# Patient Record
Sex: FEMALE | URBAN - NONMETROPOLITAN AREA
[De-identification: names, ages, dates, MRNs, and addresses within clinical notes are randomized per-mention and may not be internally consistent; named-entity substitution may affect disease eponyms.]

---

## 2018-02-14 ENCOUNTER — CONVERSION ENCOUNTER (OUTPATIENT)
Dept: FAMILY MEDICINE CLINIC | Age: 33
End: 2018-02-14

## 2018-02-15 LAB
ALBUMIN SERPL-MCNC: 4.4 G/DL
ALBUMIN/GLOB SERPL: 1.7 {RATIO}
ALP SERPL-CCNC: 88 IU/L
ALT SERPL-CCNC: 10 IU/L
AST SERPL-CCNC: 15 IU/L
BASOPHILS # BLD AUTO: 0 X10E3/UL
BASOPHILS NFR BLD AUTO: 0 %
BILIRUB SERPL-MCNC: 0.5 MG/DL
BUN SERPL-MCNC: 13 MG/DL
BUN/CREAT SERPL: 19
CALCIUM SERPL-MCNC: 9.3 MG/DL
CHLORIDE SERPL-SCNC: 101 MMOL/L
CHOLEST SERPL-MCNC: 186 MG/DL
CO2 SERPL-SCNC: 23 MMOL/L
CONV IMMATURE GRAN: 0 %
CONV TOTAL PROTEIN: 7 G/DL
CREAT UR-MCNC: 0.69 MG/DL
EOSINOPHIL # BLD AUTO: 0.1 X10E3/UL
EOSINOPHIL # BLD AUTO: 2 %
ERYTHROCYTE [DISTWIDTH] IN BLOOD BY AUTOMATED COUNT: 13.3 %
GLOBULIN UR ELPH-MCNC: 2.6 G/DL
GLUCOSE SERPL-MCNC: 75 MG/DL
HCT VFR BLD AUTO: 39.9 %
HDLC SERPL-MCNC: 44 MG/DL
HGB BLD-MCNC: 13.2 G/DL
IMM GRANULOCYTES # BLD: 0 X10E3/UL
LDLC SERPL CALC-MCNC: 130 MG/DL
LYMPHOCYTES # BLD AUTO: 1.6 X10E3/UL
LYMPHOCYTES NFR BLD AUTO: 29 %
MCH RBC QN AUTO: 32.4 PG
MCHC RBC AUTO-ENTMCNC: 33.1 G/DL
MCV RBC AUTO: 98 FL
MONOCYTES # BLD AUTO: 0.4 X10E3/UL
MONOCYTES NFR BLD AUTO: 7 %
NEUTROPHILS # BLD AUTO: 3.5 X10E3/UL
NEUTROPHILS NFR BLD AUTO: 62 %
PLATELET # BLD AUTO: 280 X10E3/UL
POTASSIUM SERPL-SCNC: 4.4 MMOL/L
RBC # BLD AUTO: 4.07 X10E6/UL
SODIUM SERPL-SCNC: 137 MMOL/L
TRIGL SERPL-MCNC: 60 MG/DL
TSH SERPL-ACNC: 1.52 UIU/ML
VLDLC SERPL-MCNC: 12 MG/DL
WBC # BLD AUTO: 5.7 X10E3/UL

## 2018-02-19 ENCOUNTER — OFFICE VISIT CONVERTED (OUTPATIENT)
Dept: FAMILY MEDICINE CLINIC | Age: 33
End: 2018-02-19
Attending: NURSE PRACTITIONER

## 2019-01-11 ENCOUNTER — OFFICE VISIT CONVERTED (OUTPATIENT)
Dept: FAMILY MEDICINE CLINIC | Age: 34
End: 2019-01-11
Attending: NURSE PRACTITIONER

## 2020-02-21 ENCOUNTER — OFFICE VISIT CONVERTED (OUTPATIENT)
Dept: FAMILY MEDICINE CLINIC | Age: 35
End: 2020-02-21
Attending: FAMILY MEDICINE

## 2020-02-21 ENCOUNTER — HOSPITAL ENCOUNTER (OUTPATIENT)
Dept: OTHER | Facility: HOSPITAL | Age: 35
Discharge: HOME | End: 2020-02-21
Attending: FAMILY MEDICINE

## 2020-02-21 LAB
ALBUMIN SERPL-MCNC: 4.5 G/DL (ref 3.5–5)
ALBUMIN/GLOB SERPL: 1.5 {RATIO} (ref 1.4–2.6)
ALP SERPL-CCNC: 99 U/L (ref 42–98)
ALT SERPL-CCNC: 12 U/L (ref 10–40)
ANION GAP SERPL CALC-SCNC: 19 MMOL/L (ref 8–19)
AST SERPL-CCNC: 17 U/L (ref 15–50)
BASOPHILS # BLD MANUAL: 0.03 10*3/UL (ref 0–0.2)
BASOPHILS NFR BLD MANUAL: 0.5 % (ref 0–3)
BILIRUB SERPL-MCNC: 0.35 MG/DL (ref 0.2–1.3)
BUN SERPL-MCNC: 10 MG/DL (ref 5–25)
BUN/CREAT SERPL: 13 {RATIO} (ref 6–20)
CALCIUM SERPL-MCNC: 9.5 MG/DL (ref 8.7–10.4)
CHLORIDE SERPL-SCNC: 105 MMOL/L (ref 99–111)
CHOLEST SERPL-MCNC: 185 MG/DL (ref 107–200)
CHOLEST/HDLC SERPL: 3.9 {RATIO} (ref 3–6)
CONV CO2: 22 MMOL/L (ref 22–32)
CONV TOTAL PROTEIN: 7.6 G/DL (ref 6.3–8.2)
CREAT UR-MCNC: 0.77 MG/DL (ref 0.5–0.9)
DEPRECATED RDW RBC AUTO: 43.4 FL
EOSINOPHIL # BLD MANUAL: 0.17 10*3/UL (ref 0–0.7)
EOSINOPHIL NFR BLD MANUAL: 3.1 % (ref 0–7)
ERYTHROCYTE [DISTWIDTH] IN BLOOD BY AUTOMATED COUNT: 12 % (ref 11.5–14.5)
GFR SERPLBLD BASED ON 1.73 SQ M-ARVRAT: >60 ML/MIN/{1.73_M2}
GLOBULIN UR ELPH-MCNC: 3.1 G/DL (ref 2–3.5)
GLUCOSE SERPL-MCNC: 86 MG/DL (ref 65–99)
GRANS (ABSOLUTE): 3.03 10*3/UL (ref 2–8)
GRANS: 55.3 % (ref 30–85)
HBA1C MFR BLD: 13.9 G/DL (ref 12–16)
HCT VFR BLD AUTO: 41.6 % (ref 37–47)
HDLC SERPL-MCNC: 47 MG/DL (ref 40–60)
IMM GRANULOCYTES # BLD: 0 10*3/UL (ref 0–0.54)
IMM GRANULOCYTES NFR BLD: 0 % (ref 0–0.43)
LDLC SERPL CALC-MCNC: 123 MG/DL (ref 70–100)
LYMPHOCYTES # BLD MANUAL: 1.89 10*3/UL (ref 1–5)
LYMPHOCYTES NFR BLD MANUAL: 6.6 % (ref 3–10)
MCH RBC QN AUTO: 32.6 PG (ref 27–31)
MCHC RBC AUTO-ENTMCNC: 33.4 G/DL (ref 33–37)
MCV RBC AUTO: 97.4 FL (ref 81–99)
MONOCYTES # BLD AUTO: 0.36 10*3/UL (ref 0.2–1.2)
OSMOLALITY SERPL CALC.SUM OF ELEC: 292 MOSM/KG (ref 273–304)
PLATELET # BLD AUTO: 308 10*3/UL (ref 130–400)
PMV BLD AUTO: 9.2 FL (ref 7.4–10.4)
POTASSIUM SERPL-SCNC: 4.3 MMOL/L (ref 3.5–5.3)
RBC # BLD AUTO: 4.27 10*6/UL (ref 4.2–5.4)
SODIUM SERPL-SCNC: 142 MMOL/L (ref 135–147)
TRIGL SERPL-MCNC: 76 MG/DL (ref 40–150)
TSH SERPL-ACNC: 1.47 M[IU]/L (ref 0.27–4.2)
VARIANT LYMPHS NFR BLD MANUAL: 34.5 % (ref 20–45)
VLDLC SERPL-MCNC: 15 MG/DL (ref 5–37)
WBC # BLD AUTO: 5.48 10*3/UL (ref 4.8–10.8)

## 2020-11-03 ENCOUNTER — HOSPITAL ENCOUNTER (OUTPATIENT)
Dept: OTHER | Facility: HOSPITAL | Age: 35
Discharge: HOME | End: 2020-11-03
Attending: NURSE PRACTITIONER

## 2020-11-03 ENCOUNTER — OFFICE VISIT CONVERTED (OUTPATIENT)
Dept: FAMILY MEDICINE CLINIC | Age: 35
End: 2020-11-03

## 2020-11-07 LAB — SARS-COV-2 RNA SPEC QL NAA+PROBE: DETECTED

## 2020-12-17 ENCOUNTER — HOSPITAL ENCOUNTER (OUTPATIENT)
Dept: OTHER | Facility: HOSPITAL | Age: 35
Discharge: HOME | End: 2020-12-17
Attending: FAMILY MEDICINE

## 2020-12-17 ENCOUNTER — OFFICE VISIT CONVERTED (OUTPATIENT)
Dept: FAMILY MEDICINE CLINIC | Age: 35
End: 2020-12-17
Attending: FAMILY MEDICINE

## 2020-12-17 LAB
ALBUMIN SERPL-MCNC: 4.2 G/DL (ref 3.5–5)
ALBUMIN/GLOB SERPL: 1.2 {RATIO} (ref 1.4–2.6)
ALP SERPL-CCNC: 106 U/L (ref 42–98)
ALT SERPL-CCNC: 14 U/L (ref 10–40)
ANION GAP SERPL CALC-SCNC: 12 MMOL/L (ref 8–19)
AST SERPL-CCNC: 16 U/L (ref 15–50)
BILIRUB SERPL-MCNC: 0.31 MG/DL (ref 0.2–1.3)
BUN SERPL-MCNC: 9 MG/DL (ref 5–25)
BUN/CREAT SERPL: 11 {RATIO} (ref 6–20)
CALCIUM SERPL-MCNC: 9.5 MG/DL (ref 8.7–10.4)
CHLORIDE SERPL-SCNC: 105 MMOL/L (ref 99–111)
CONV CO2: 25 MMOL/L (ref 22–32)
CONV TOTAL PROTEIN: 7.6 G/DL (ref 6.3–8.2)
CREAT UR-MCNC: 0.81 MG/DL (ref 0.5–0.9)
D DIMER PPP FEU-MCNC: 0.68 MG/L (ref 0–0.59)
ERYTHROCYTE [DISTWIDTH] IN BLOOD BY AUTOMATED COUNT: 12.4 % (ref 11.5–14.5)
ERYTHROCYTE [SEDIMENTATION RATE] IN BLOOD: 25 MM/H (ref 0–20)
GFR SERPLBLD BASED ON 1.73 SQ M-ARVRAT: >60 ML/MIN/{1.73_M2}
GLOBULIN UR ELPH-MCNC: 3.4 G/DL (ref 2–3.5)
GLUCOSE SERPL-MCNC: 94 MG/DL (ref 65–99)
HBA1C MFR BLD: 14 G/DL (ref 12–16)
HCT VFR BLD AUTO: 41 % (ref 37–47)
MAGNESIUM SERPL-MCNC: 2.03 MG/DL (ref 1.6–2.3)
MCH RBC QN AUTO: 33.6 PG (ref 27–31)
MCHC RBC AUTO-ENTMCNC: 34.1 G/DL (ref 33–37)
MCV RBC AUTO: 98.3 FL (ref 81–99)
OSMOLALITY SERPL CALC.SUM OF ELEC: 284 MOSM/KG (ref 273–304)
PLATELET # BLD AUTO: 328 10*3/UL (ref 130–400)
PMV BLD AUTO: 9.3 FL (ref 7.4–10.4)
POTASSIUM SERPL-SCNC: 4 MMOL/L (ref 3.5–5.3)
RBC # BLD AUTO: 4.17 10*6/UL (ref 4.2–5.4)
SODIUM SERPL-SCNC: 138 MMOL/L (ref 135–147)
T4 FREE SERPL-MCNC: 1.3 NG/DL (ref 0.9–1.8)
TSH SERPL-ACNC: 1.14 M[IU]/L (ref 0.27–4.2)
WBC # BLD AUTO: 8.92 10*3/UL (ref 4.8–10.8)

## 2020-12-18 ENCOUNTER — HOSPITAL ENCOUNTER (OUTPATIENT)
Dept: OTHER | Facility: HOSPITAL | Age: 35
Discharge: HOME | End: 2020-12-18
Attending: FAMILY MEDICINE

## 2020-12-22 ENCOUNTER — CONVERSION ENCOUNTER (OUTPATIENT)
Dept: CARDIOLOGY | Facility: CLINIC | Age: 35
End: 2020-12-22
Attending: INTERNAL MEDICINE

## 2020-12-30 ENCOUNTER — OFFICE VISIT CONVERTED (OUTPATIENT)
Dept: FAMILY MEDICINE CLINIC | Age: 35
End: 2020-12-30
Attending: NURSE PRACTITIONER

## 2020-12-30 ENCOUNTER — HOSPITAL ENCOUNTER (OUTPATIENT)
Dept: OTHER | Facility: HOSPITAL | Age: 35
Discharge: HOME | End: 2020-12-30
Attending: NURSE PRACTITIONER

## 2021-01-01 LAB — BACTERIA UR CULT: NORMAL

## 2021-01-25 ENCOUNTER — HOSPITAL ENCOUNTER (OUTPATIENT)
Dept: OTHER | Facility: HOSPITAL | Age: 36
Discharge: HOME | End: 2021-01-25
Attending: NURSE PRACTITIONER

## 2021-01-25 LAB
APPEARANCE UR: CLEAR
BACTERIA UR QL AUTO: NORMAL
BILIRUB UR QL: NEGATIVE
CASTS URNS QL MICRO: NORMAL /[LPF]
COLOR UR: YELLOW
CONV LEUKOCYTE ESTERASE: NEGATIVE
CONV UROBILINOGEN IN URINE BY AUTOMATED TEST STRIP: 0.2 {EHRLICHU}/DL (ref 0.1–1)
EPI CELLS #/AREA URNS HPF: NORMAL /[HPF]
GLUCOSE 24H UR-MCNC: NEGATIVE MG/DL
HGB UR QL STRIP: NEGATIVE
KETONES UR QL STRIP: NEGATIVE MG/DL
MUCOUS THREADS URNS QL MICRO: NORMAL
NITRITE UR-MCNC: NEGATIVE MG/ML
PH UR STRIP.AUTO: 7 [PH] (ref 5–8)
PROT UR-MCNC: NEGATIVE MG/DL
RBC # BLD AUTO: NORMAL /[HPF]
SP GR UR STRIP: 1.01 (ref 1–1.03)
SPECIMEN SOURCE: NORMAL
UNIDENT CRYS URNS QL MICRO: NORMAL /[HPF]
WBC #/AREA URNS HPF: NORMAL /[HPF]

## 2021-05-10 NOTE — PROCEDURES
Procedure Note      Patient Name: Naina Orantes   Patient ID: [id number]   Sex: Female   YOB: 1985    Referring Provider: Hemanth Mcclain MD    Visit Date: December 22, 2020    Provider: Ever Domínguez MD   Location: [facility]   Location Address: [address] [place] [address]  [id number]   Location Phone: [phone]          FINAL REPORT   HOLTER MONITOR REPORT  Date: 12/17/2020   Indication: Tachycardia      FINDINGS:   The patient was monitored for 24 hours.  The average heart rate was 99 beats per minute with underlying sinus rhythm. The minimum heart rate of 64 beats per minute occurred at 8:16 AM and was sinus rhythm. The maximum heart rate of 166 beats per minute occurred at 8:53 AM and was sinus tachycardia. No bradycardia was observed during the study. Frequent sinus tachycardia with a heart rate greater than 120 beats per minute was observed, constituting 283 minutes during the study, which represents 19.7% of the total heartbeats. Very rare ventricular ectopic activity was noted, consisting of one isolated PVC. There was no evidence of ventricular tachycardia. No PACs were observed. Likewise, there was no evidence of atrial fibrillation/flutter, or SVT. There were no prolonged pauses and there was no evidence of atrioventricular block. No symptom diary was returned.     CONCLUSIONS:  Sinus rhythm with very frequent sinus tachycardia, as detailed above. No evidence of arrhythmias. Very rare isolated PVCs. No symptom diary returned.       Ever Domínguez MD  BAP/pap                 Electronically Signed by: Fernanda Johnson-, Other -Author on December 22, 2020 03:34:52 PM  Electronically Co-signed by: Ever Domínguez MD -Reviewer on December 27, 2020 10:06:53 AM

## 2021-05-18 NOTE — PROGRESS NOTES
Naina Orantes  1985     Office/Outpatient Visit    Visit Date: Thu, Dec 17, 2020 01:37 pm    Provider: Hemanth Mcclain MD (Assistant: Dana Cruz MA)    Location: [facility]        Electronically signed by Hemanth Mcclain MD on  2020 08:42:24 AM                             Subjective:        CC: tachycardiaMs. Lamberto is a 35 year old White female.  Covid postive 20. Has had elevated pulse since;         HPI:       Naina is in today for follow up on tachycardia.  She was diagnosed with COVID-19 in early November.  She had some GI upset then but she noted that her heart rate was elevated.  Naina actually came in for that reason and thought she was dehydrated.  She has over the last 6 weeks noted ongoing issues with rapid heart beat.  This is not present all the time.  It seems to be more episodic.  Yesterday was the worst.  She says that the heart rate will go up and then come back down.  She did have associated headache yesterday.  She feels okay.  She is concerned by the fluttering and the rapid heart beat.    ROS:     CONSTITUTIONAL:  Negative for chills and fever.      E/N/T:  Negative for diminished hearing and nasal congestion.      CARDIOVASCULAR:  Negative for chest pain.      RESPIRATORY:  Positive for dyspnea ( every once in a while - not necessarily related to tachycardia ).   Negative for recent cough.      GASTROINTESTINAL:  Negative for abdominal pain, nausea and vomiting.      INTEGUMENTARY/BREAST:  Negative for atypical mole(s) and rash.          Past Medical History / Family History / Social History:         Last Reviewed on 2020 02:06 PM by Hemanth Mcclain    Past Medical History:                 PAST MEDICAL HISTORY         history of near syncopy , did have a cardiac work up         GYNECOLOGICAL HISTORY:    ; 1 molar pregnancy    Contraception: partner is S/P vasectomy;         PREVENTIVE HEALTH MAINTENANCE              PAP SMEAR: was last done 4/2017 with normal results         Surgical History:         Dilation and Curettage: 2009;         Family History:     Father:     Mother: Hypertension;  Hypothyroidism     Brother(s): 1 brother(s) total;  episodes fo low blood sugar     Paternal Grandmother: Type 2 Diabetes ( feels like she may have been diet controlled )     Maternal Grandfather: Myocardial Infarction ( has had multople episodes,and bypass surrgery )     Maternal Grandmother: COPD         Social History:     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  Non-drinker     Caffeine:  She admits to consuming caffeine via soda ( 1 serving per day ).          Substance Abuse History:     Last Reviewed on 12/17/2020 02:06 PM by Hemanth Mcclain    NEGATIVE         Mental Health History:     Last Reviewed on 12/17/2020 02:06 PM by Hemanth Mcclain        Communicable Diseases (eg STDs):     Last Reviewed on 12/17/2020 02:06 PM by Hemanth Mcclain        Current Problems:     Last Reviewed on 12/17/2020 02:06 PM by Hemanth Mcclain    Encounter for general adult medical examination without abnormal findings    Fever, unspecified    Headache, unspecified    COVID-19    Tachycardia, unspecified        Immunizations:     None        Allergies:     Last Reviewed on 12/17/2020 02:06 PM by Hemanth Mcclain    Zithromax Z-Quique:          Current Medications:     Last Reviewed on 12/17/2020 02:06 PM by Hemanth Mcclain    No Known Medications.    aspirin 81 mg oral tablet, delayed release (enteric coated) [take 1 tablet (81 mg) by oral route once daily]    loratadine 10 mg oral tablet [take 1 tablet (10 mg) by oral route once daily]        Objective:        Vitals:         Current: 12/17/2020 1:42:52 PM    Ht:  5  ft, 3.5 in;  Wt: 167.4 lbs;  BMI: 29.2T: 96.9 F (temporal);  BP: 128/72 mm Hg (left arm, sitting);  P: 131 bpm (left arm (BP Cuff), sitting);  sCr: 0.77 mg/dL;  GFR: 104.78O2 Sat: 100 %        Exams:     PHYSICAL EXAM:     GENERAL: vital signs recorded - well developed, well nourished;  no apparent distress;     EYES: extraocular movements intact; conjunctiva and cornea are normal; PERRL;     E/N/T: EARS:  normal external auditory canals and tympanic membranes;  grossly normal hearing;     NECK: range of motion is normal; thyroid is non-palpable;     RESPIRATORY: normal respiratory rate and pattern with no distress; normal breath sounds with no rales, rhonchi, wheezes or rubs;     CARDIOVASCULAR: moderately tachycardic;  rhythm is regular;  no systolic murmur; no edema;     GASTROINTESTINAL: nontender; normal bowel sounds; no organomegaly;     LYMPHATIC: no enlargement of cervical or facial nodes; no supraclavicular nodes;     BREAST/INTEGUMENT: SKIN: no significant rashes or lesions; no suspicious moles;     NEUROLOGIC: mental status: alert and oriented x 3; cranial nerves II-XII grossly intact;     PSYCHIATRIC: appropriate affect and demeanor; normal psychomotor function;         Lab/Test Results:         LABORATORY RESULTS: EKG performed by tls         Procedures:     Tachycardia, unspecified        Holter Monitor: Holter monitor was hooked up, Ms. Orantes was given instructions on use.  Patient informed to return with device. 24 hour monitor tls             Assessment:         R00.0   Tachycardia, unspecified       U07.1   COVID-19           ORDERS:         Radiology/Test Orders:       73090  Electrocardiogram, routine with at least 12 leads; with interpretation and report  (In-House)            84128  Holter monitor connection and disconnection  (In-House)              Lab Orders:       62844  BDCB2 - [facility] CBC w/o diff  (Send-Out)            96129  COMP - [facility] Comp. Metabolic Panel  (Send-Out)            41607  MG  - [facility] Magnesium, Serum  (Send-Out)            17394  THYII - [facility] Thyroid panel with TSH (49125, 17126)  (Send-Out)            12339  SED - [facility] Sedimentation rate, non-automated ESR  (Send-Out)            64179  D-DIM - [facility] D-Dimer  (Send-Out)              Other Orders:       94944  Noninvasive ear or pulse oximetry for oxygen saturation; single determination  (In-House)                      Plan:         Tachycardia, unspecified    LABORATORY:  Labs ordered to be performed today include CBC W/O DIFF, Comprehensive metabolic panel, Magnesium level, and Thyroid Panel.      TESTS/PROCEDURES:  Will proceed with an ECG and Holter Monitor 24 hour to be performed/scheduled now.      RECOMMENDATIONS given include: Naina is concerned about the rapid heart beat.  We will begin some evaluation . It is not clear whether this is related to COVID, but the timing would suggest that.  We will see what her testing shows and then consider how to move forward.  I'm likely to refer to cardiology for their evaluation and/or get an echo..            Orders:       62621  BDCB2 - [facility] CBC w/o diff  (Send-Out)            21630  COMP - [facility] Comp. Metabolic Panel  (Send-Out)            21571  MG - [facility] Magnesium, Serum  (Send-Out)            78963  THYII - [facility] Thyroid panel with TSH (24338, 01489)  (Send-Out)            03529  Electrocardiogram, routine with at least 12 leads; with interpretation and report  (In-House)            96257  Holter monitor connection and disconnection  (In-House)              COVID-19    LABORATORY:  Labs ordered to be performed today include D-Dimer and ESR.      TESTS/PROCEDURES:  Will proceed with Pulse Oximetry (O2 SAT) to be performed/scheduled now.            Orders:       04768  Noninvasive ear or pulse oximetry for oxygen saturation; single determination  (In-House)            87659  SED - [facility] Sedimentation rate, non-automated ESR  (Send-Out)            66912  D-DIM - [facility] D-Dimer  (Send-Out)                   Charge Capture:         Primary Diagnosis:     R00.0  Tachycardia, unspecified           Orders:      36797  Office/outpatient visit; established patient, level 4  (In-House)            07606  Electrocardiogram, routine with at least 12 leads; with interpretation and report  (In-House)            44457  Holter monitor connection and disconnection  (In-House)              U07.1  COVID-19           Orders:      09815  Noninvasive ear or pulse oximetry for oxygen saturation; single determination  (In-House)                  ADDENDUMS:      ____________________________________    Addendum: 12/18/2020 03:53 PM - RusAkiko bose        24hr holter returned and data up loaded to [facility]./pr

## 2021-05-18 NOTE — PROGRESS NOTES
Naina Orantes  1985     Office/Outpatient Visit    Visit Date: Tue, Nov 3, 2020 03:57 pm    Provider: Anabel Cano N.P. (Assistant: Nuzhat Tate LPN)    Location: [facility]        Electronically signed by Anabel Cano N.P. on  2020 04:59:40 PM                             Subjective:        CC: Ms. Orantes is a 35 year old White female.  Low grade fever, HA, weak, chills; Did covid, just need order        HPI:       patient reports last night fever- low grade, headache, chills. Today patient reports feels fatigued. No known exposure. Denies nausea, vomiting, loss of taste or smell.     ROS:     CONSTITUTIONAL:  Positive for chills, fatigue and fever.      EYES:  Negative for blurred vision.      E/N/T:  Negative for ear pain, nasal congestion, frequent rhinorrhea, hoarseness, sinus pressure and sore throat.      CARDIOVASCULAR:  Negative for chest pain and pedal edema.      RESPIRATORY:  Negative for recent cough and dyspnea.      GASTROINTESTINAL:  Negative for abdominal pain, constipation, diarrhea, nausea and vomiting.      GENITOURINARY:  Negative for dysuria and frequent urination.      MUSCULOSKELETAL:  Negative for myalgias.      NEUROLOGICAL:  Positive for headaches.      PSYCHIATRIC:  Negative for anxiety, depression, and sleep disturbances.          Past Medical History / Family History / Social History:         Last Reviewed on 2020 04:24 PM by Anabel Cano    Past Medical History:                 PAST MEDICAL HISTORY         history of near syncopy , did have a cardiac work up         GYNECOLOGICAL HISTORY:    ; 1 molar pregnancy    Contraception: partner is S/P vasectomy;         PREVENTIVE HEALTH MAINTENANCE             PAP SMEAR: was last done 2017 with normal results         Surgical History:         Dilation and Curettage: ;         Family History:     Father:     Mother: Hypertension;  Hypothyroidism     Brother(s): 1 brother(s) total;   episodes fo low blood sugar     Paternal Grandmother: Type 2 Diabetes ( feels like she may have been diet controlled )     Maternal Grandfather: Myocardial Infarction ( has had multople episodes,and bypass surrgery )     Maternal Grandmother: COPD         Social History:     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  Non-drinker     Caffeine:  She admits to consuming caffeine via soda ( 1 serving per day ).          Substance Abuse History:     Last Reviewed on 11/03/2020 04:24 PM by Anabel Cano    NEGATIVE         Mental Health History:     Last Reviewed on 2/21/2020 09:08 AM by Hemanth Mcclain        Communicable Diseases (eg STDs):     Last Reviewed on 2/21/2020 09:08 AM by Hemanth Mcclain        Immunizations:     None        Allergies:     Last Reviewed on 11/03/2020 04:24 PM by Anabel Cano    Zithromax Z-Quique:          Current Medications:     Last Reviewed on 11/03/2020 04:24 PM by Anabel Cano    No Known Medications.        Objective:        Vitals:         Current: 11/3/2020 4:06:39 PM    Ht:  5 ft, 3.5 in;  Wt: 164.5 lbs;  BMI: 28.7T: 96.2 F (oral);  BP: 114/83 mm Hg (left arm, sitting);  P: 115 bpm (left arm (BP Cuff), sitting);  sCr: 0.77 mg/dL;  GFR: 104.00        Repeat:     4:58:4 PM  P:   108bpm    Exams:     PHYSICAL EXAM:     GENERAL:  well developed and nourished; appropriately groomed; in no apparent distress;     EYES: PERRL, EOMI     E/N/T:  normal EACs, TMs, nasal/oral mucosa, teeth, gingiva, and oropharynx;     NECK:  supple, full ROM; no thyromegaly; no carotid bruits;     RESPIRATORY: normal respiratory rate and pattern with no distress; normal breath sounds with no rales, rhonchi, wheezes or rubs;     CARDIOVASCULAR: mildly tachycardic;  rhythm is regular;  no systolic murmur; no  edema;     GASTROINTESTINAL: nontender; normal bowel sounds; no organomegaly;     LYMPHATIC: no enlargement of cervical or facial nodes; no supraclavicular nodes;     BREAST/INTEGUMENT: no rashs or lesions noted;     MUSCULOSKELETAL:  gait normal;     NEUROLOGIC: mental status: alert and oriented x 3; GROSSLY INTACT     PSYCHIATRIC:  appropriate affect and demeanor; normal speech pattern; grossly normal memory;         Lab/Test Results:         Influenza A and B: Negative (11/03/2020),     Performed by:: pr (11/03/2020),     Glucose, Urine: Neg (11/03/2020),     Bilirubin, urine: Negative (11/03/2020),     Ketones, Urine Strip: Negative (11/03/2020),     Specific Gravity, urine: less than 1.005 (11/03/2020),     Blood in Urine: negative (11/03/2020),     pH, urine: 6.5 (11/03/2020),     Protein Urine QL: negative (11/03/2020),     Urobilinogen, urine: 0.2 E.U./dL (11/03/2020),     Nitrite, Urine: Negative (11/03/2020),     Leukoctyes, urine: Negative (11/03/2020),     Appearance: Clear (11/03/2020),     collection source: Clean-catch (11/03/2020),     Color: Light Yellow (11/03/2020),             Assessment:         R50.9   Fever, unspecified       R51.9   Headache, unspecified           ORDERS:         Radiology/Test Orders:       87274  COVID 19 Testing  (Send-Out)              Lab Orders:       [id number]  Infectious agent antigen detection by immunoassay; Influenza  (In-House)            46551  Infectious agent antigen detection by immunoassay; Influenza  (In-House)                      Plan:         Fever, unspecified          Orders:       13239  Infectious agent antigen detection by immunoassay; Influenza  (In-House)            87833  Infectious agent antigen detection by immunoassay; Influenza  (In-House)              Headache, unspecified    LABORATORY:  Labs ordered to be performed today include COVID 19 Testing.      RECOMMENDATIONS given include: Educated patient that we are awaiting covid-19 test results.  During this time quarantine, self isolate and self monitor. Educated to call or report to the ER if having worsening shortness of breath, cough, high fever, or new symptoms. Patient understood these instructions..            Orders:       73727  COVID 19 Testing  (Send-Out)                  Charge Capture:         Primary Diagnosis:     R50.9  Fever, unspecified           Orders:      68114  Office/outpatient visit; established patient, level 3  (In-House)            87659  Infectious agent antigen detection by immunoassay; Influenza  (In-House)            90079  Infectious agent antigen detection by immunoassay; Influenza  (In-House)              R51.9  Headache, unspecified         ADDENDUMS:      ____________________________________    Addendum: 11/06/2020 12:25 PM - Akiko Mustafa        please add modifier 59 to the influenza charge./pr

## 2021-05-18 NOTE — PROGRESS NOTES
Naina Orantes  1985     Office/Outpatient Visit    Visit Date: Wed, Dec 30, 2020 01:50 pm    Provider: Emely Suarez N.P. (Assistant: Daniel Landis MA)    Location: [facility]        Electronically signed by Emely Suarez N.P. on  12/31/2020 01:14:45 PM                             Subjective:        CC: Ms. Orantes is a 35 year old White female.  painful and frequent urination;         HPI:           The patient reports and.  The urinary symptoms began within the last 1 to 2 weeks.  Associated symptoms include abdominal pain ( moderately intense, dull, suprapubic, pressure ).  Ms. Orantes states that she had multiple prior episodes of similar discomfort.  Her most recent prior episode was about a year ago..  She denies a history of hematuria (unexplained) and renal stones.  Treatments tried thus far for this episode include AZO.  She did notice today when she wiped, she had blood on TP, but thought to be vaginal  (although she is not supposed to be on her period today) but is now resolved.  Unsure as to the significance of this .  She does report that she had covid in November and has had some heart issues associated with this , so this has been a concern for her when this started and she developed the blood    ROS:     CONSTITUTIONAL:  Negative for chills, fatigue and fever.      CARDIOVASCULAR:  Negative for chest pain and pedal edema.      RESPIRATORY:  Negative for recent cough and dyspnea.      GASTROINTESTINAL:  Positive for abdominal pain ( suprapubic ).   Negative for constipation, diarrhea, heartburn, nausea or vomiting.      GENITOURINARY:  Positive for dysuria, blood on toilet paper - unsure if vaginal or from urine and frequent urination.      MUSCULOSKELETAL:  Negative for back pain.      PSYCHIATRIC:  Positive for anxiety.   Negative for depression or suicidal thoughts.          Past Medical History / Family History / Social History:         Last Reviewed on  2020 02:06 PM by Hemanth Mcclain    Past Medical History:                 PAST MEDICAL HISTORY         history of near syncopy , did have a cardiac work up         GYNECOLOGICAL HISTORY:    ; 1 molar pregnancy    Contraception: partner is S/P vasectomy;         PREVENTIVE HEALTH MAINTENANCE             PAP SMEAR: was last done 2017 with normal results         Surgical History:         Dilation and Curettage: ;         Family History:     Father:     Mother: Hypertension;  Hypothyroidism     Brother(s): 1 brother(s) total;  episodes fo low blood sugar     Paternal Grandmother: Type 2 Diabetes ( feels like she may have been diet controlled )     Maternal Grandfather: Myocardial Infarction ( has had multople episodes,and bypass surrgery )     Maternal Grandmother: COPD         Social History:     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  Non-drinker     Caffeine:  She admits to consuming caffeine via soda ( 1 serving per day ).          Substance Abuse History:     Last Reviewed on 2020 02:06 PM by Hemanth Mcclain    NEGATIVE         Mental Health History:     Last Reviewed on 2020 02:06 PM by Hemanth Mcclain        Communicable Diseases (eg STDs):     Last Reviewed on 2020 02:06 PM by Hemanth Mcclain        Current Problems:     Last Reviewed on 2020 02:06 PM by Hemanth Mcclain    Encounter for general adult medical examination without abnormal findings    Fever, unspecified    Headache, unspecified    COVID-19    Tachycardia, unspecified    Dyspnea, unspecified        Immunizations:     None        Allergies:     Last Reviewed on 2020 01:53 PM by Daniel Landis Z-Quique:          Current Medications:     Last Reviewed on  12/30/2020 01:53 PM by Daniel Landis    No Known Medications.    aspirin 81 mg oral tablet, delayed release (enteric coated) [take 1 tablet (81 mg) by oral route once daily]    loratadine 10 mg oral tablet [take 1 tablet (10 mg) by oral route once daily]        Objective:        Vitals:         Current: 12/30/2020 1:56:09 PM    Ht:  5 ft, 3.5 in;  Wt: 168 lbs;  BMI: 29.3T: 97.6 F (temporal);  BP: 113/64 mm Hg (right arm, sitting);  P: 91 bpm (right arm (BP Cuff), sitting);  sCr: 0.81 mg/dL;  GFR: 99.75        Exams:     PHYSICAL EXAM:     GENERAL: vital signs recorded -     EYES: extraocular movements intact; conjunctiva and cornea are normal;     RESPIRATORY: normal respiratory rate and pattern with no distress;     MUSCULOSKELETAL: normal overall tone     NEUROLOGIC: mental status: alert and oriented x 3;     PSYCHIATRIC: affect/demeanor: anxious;  normal psychomotor function; normal speech pattern;         Lab/Test Results:         Glucose, Urine: Neg (12/30/2020),     Bilirubin, urine: Negative (12/30/2020),     Ketones, Urine Strip: Negative (12/30/2020),     Specific Gravity, urine: 1.015 (12/30/2020),     Blood in Urine: moderate (12/30/2020),     pH, urine: 6.5 (12/30/2020),     Protein Urine QL: negative (12/30/2020),     Urobilinogen, urine: 0.2 E.U./dL (12/30/2020),     Nitrite, Urine: Negative (12/30/2020),     Leukoctyes, urine: Small (12/30/2020),     Appearance: Clear (12/30/2020),     collection source: Clean-catch (12/30/2020),     Color: Light Yellow (12/30/2020),     Performed by:: angi (12/30/2020),             Assessment:         N39.0   Urinary tract infection, site not specified       R31.9   Hematuria, unspecified       U07.1   COVID-19           ORDERS:         Meds Prescribed:       [New Rx] Macrobid 100 mg oral capsule [take 1 capsule (100 mg) by oral route 2 times per day with food], #14 (fourteen) capsules, Refills: 0 (zero)       [New Rx] Pyridium 100 mg oral tablet [take 1 tablet  (100 mg) by oral route 3 times per day after meals], #6 (six) tablets, Refills: 0 (zero)         Lab Orders:       [id number]  Urinalysis, automated, without microscopy  (In-House)            [facility]                      Plan:         Urinary tract infection, site not specified    LABORATORY:  Labs ordered to be performed today include Urine culture.            Prescriptions:       [New Rx] Macrobid 100 mg oral capsule [take 1 capsule (100 mg) by oral route 2 times per day with food], #14 (fourteen) capsules, Refills: 0 (zero)       [New Rx] Pyridium 100 mg oral tablet [take 1 tablet (100 mg) by oral route 3 times per day after meals], #6 (six) tablets, Refills: 0 (zero)           Orders:       [id number]  Urinalysis, automated, without microscopy  (In-House)            81429  [facility] Urine Culture  (Send-Out)              Hematuria, unspecifiedWe will send for culture and if negative, she will need to repeat the urine for follow up on hematuria - Discussed that should the blood be visible over the next few days, or worsen, she will need to go to ER/UC for further evalaution if we are unavailable        COVID-19unsure if there is any relation to her current symptoms and recent covid infection. But will keep as consideration.  if her culture is positive, more reassuring.              Charge Capture:         Primary Diagnosis:     N39.0  Urinary tract infection, site not specified           Orders:      [id number]  Office/outpatient visit; established patient, level 4  (In-House)            45501  Urinalysis, automated, without microscopy  (In-House)              R31.9  Hematuria, unspecified     U07.1  COVID-19

## 2021-05-18 NOTE — PROGRESS NOTES
Lamberto Naina RENE 1985     Office/Outpatient Visit    Visit Date:  08:39 am    Provider: Irish Dave N.P. (Assistant: Tamiko Eptsein MA)    Location: [facility]        Electronically signed by Irish Dave N.P. on  2019 09:50:38 AM                             SUBJECTIVE:        CC:     Ms. Orantes is a 33 year old White female.  tower physical;         HPI:         Ms. Orantes presents with health checkup.  Her last physical exam was 1 year ago.  Her last menstrual period was 19.  She is not currently using any form of contraception.  She performs breast self-exams occasionally.    Her last Pap smear was in 2017.   Preventative Health updated today.  She is not current with her influenza immunization.  Ms. Orantes has had an abnormal Pap smear in the past. Polyp Tobacco: She has never smoked.          PHQ-9 Depression Screening: Completed form scanned and in chart; Total Score 0 Alcohol Consumption Screening: Completed form scanned and in chart; Total Score 0     ROS:     CONSTITUTIONAL:  Negative for chills, fatigue, fever, and weight change.      EYES:  Negative for blurred vision.      CARDIOVASCULAR:  Negative for chest pain, orthopnea, paroxysmal nocturnal dyspnea and pedal edema.      RESPIRATORY:  Negative for dyspnea.      GASTROINTESTINAL:  Negative for abdominal pain, constipation, diarrhea, nausea and vomiting.      NEUROLOGICAL:  Negative for dizziness, headaches, paresthesias, and weakness.      PSYCHIATRIC:  Negative for anxiety, depression, and sleep disturbances.          PMH/FMH/SH:     Last Reviewed on 2019 09:50 AM by Irish Dave    Past Medical History:                 PAST MEDICAL HISTORY         history of near syncopy , did have a cardiac work up         GYNECOLOGICAL HISTORY:    ; 1 molar pregnancy    Contraception: partner is S/P vasectomy;         PREVENTIVE HEALTH MAINTENANCE             PAP SMEAR: was last done  4/2017 with normal results         Surgical History:         Dilation and Curettage: 2009;         Family History:     Father:     Mother: Hypertension;  Hypothyroidism     Brother(s): 1 brother(s) total;  episodes fo low blood sugar     Paternal Grandmother: Type 2 Diabetes ( feels like she may have been diet controlled )     Maternal Grandfather: Myocardial Infarction ( has had multople episodes,and bypass surrgery )     Maternal Grandmother: COPD         Social History:     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  Non-drinker     Caffeine:  She admits to consuming caffeine via soda ( 1 serving per day ).              Current Problems:     Last Reviewed on 1/11/2019 09:50 AM by Irish Dave    Hypercholesterolemia     Hair thinning     Near-syncope     Tachycardia, NOS     Screening for depression         Immunizations:     None        Allergies:     Last Reviewed on 1/11/2019 09:50 AM by Irish Dave    Zithromax Z-Quique:        Current Medications:     Last Reviewed on 1/11/2019 09:50 AM by Irish Dave    None        OBJECTIVE:        Vitals:         Current: 1/11/2019 8:46:42 AM    Ht:  5 ft, 3.5 in;  Wt: 163.6 lbs;  BMI: 28.5    T: 98.4 F (oral);  BP: 112/71 mm Hg (right arm, sitting);  P: 79 bpm (right arm (BP Cuff), sitting);  sCr: 0.69 mg/dL;  GFR: 117.94        Exams:     PHYSICAL EXAM:     GENERAL: vital signs recorded - well developed, well nourished;  no apparent distress;     EYES: extraocular movements intact; conjunctiva and cornea are normal; PERRLA;     E/N/T:  normal EACs, TMs, nasal/oral mucosa, teeth, gingiva, and oropharynx;     NECK: range of motion is normal; thyroid is non-palpable;     RESPIRATORY: normal respiratory rate and pattern with no distress; normal breath sounds with no  rales, rhonchi, wheezes or rubs;     CARDIOVASCULAR: normal rate; rhythm is regular;  no systolic murmur; no edema;     GASTROINTESTINAL: nontender; normal bowel sounds; no organomegaly;     NEUROLOGICAL:  cranial nerves, motor and sensory function, reflexes, gait and coordination are all intact;     PSYCHIATRIC:  appropriate affect and demeanor; normal speech pattern; grossly normal memory;         Lab/Test Results:             Total Cholesterol:  204 (01/11/2019),     HDL:  47 (01/11/2019),     Triglycerides:  107 (01/11/2019),     LDL:  136 (01/11/2019),     non-HDL:  157 (01/11/2019),     LDL/HDL Ratio:  2.9 (01/11/2019),     Glucose capillary:  85 (01/11/2019),     Performed by::  gage (01/11/2019),             ASSESSMENT:           V70.0   Z00.00  Health checkup              DDx:     V79.0   Z13.89  Screening for depression              DDx:         ORDERS:         Lab Orders:       [id number]*  Lipid panel inhouse  (In-House)         36491  Finger Stick Glucose  (In-House)           Procedures Ordered:       [id number]  Collection of capillary blood specimen (eg, finger, heel, ear stick)  (In-House)           Other Orders:       1101F  Pt screen for fall risk; document no falls in past year or only 1 fall w/o injury in past year (PEDRO)  (In-House)           Negative EtOH screen  (In-House)           Depression screen negative  (In-House)           Calculated BMI above the upper parameter and a follow-up plan was documented in the medical record  (In-House)                   PLAN:          Health checkup [place] papers filled out and faxed, dmt     HYACINTH Has had no falls in the past year Negative alcohol screen     BMI Elevated - Follow-Up Plan: She was provided education on weight loss strategies           Orders:       [id number]  Collection of capillary blood specimen (eg, finger, heel, ear stick)  (In-House)         03853*  Lipid panel inhouse  (In-House)         37416  Finger Stick Glucose  (In-House)          1101F  Pt screen for fall risk; document no falls in past year or only 1 fall w/o injury in past year (PEDRO)  (In-House)           Negative EtOH screen  (In-House)           Calculated BMI above the upper parameter and a follow-up plan was documented in the medical record  (In-House)            Screening for depression     MIPS PHQ-9 Depression Screening Completed form scanned and in chart; Total Score 0           Orders:         Depression screen negative  (In-House)               CHARGE CAPTURE:           Primary Diagnosis:     V70.0 Health checkup            Z00.00    Encounter for general adult medical examination without abnormal findings              Orders:          [id number]   Preventive medicine, established patient, age 18-39 years  (In-House)             85704   Collection of capillary blood specimen (eg, finger, heel, ear stick)  (In-House)             19051*   Lipid panel inhouse  (In-House)             82625   Finger Stick Glucose  (In-House)             1101F   Pt screen for fall risk; document no falls in past year or only 1 fall w/o injury in past year (PEDRO)  (In-House)                Negative EtOH screen  (In-House)                Calculated BMI above the upper parameter and a follow-up plan was documented in the medical record  (In-House)           V79.0 Screening for depression            Z13.89    Encounter for screening for other disorder              Orders:             Depression screen negative  (In-House)

## 2021-05-18 NOTE — PROGRESS NOTES
Lamberto Naina RENE 1985     Office/Outpatient Visit    Visit Date:  05:57 pm    Provider: Irish Dave N.P. (Assistant: Dulce Ha MA)    Location: [facility]        Electronically signed by Irish Dave N.P. on  2018 06:55:01 PM                             SUBJECTIVE:        CC:     Ms. Orantes is a 32 year old White female.  Patient is here for yearly PE.;         HPI:         Patient to be evaluated for annual exam.  Her last physical exam was 1 year ago.  Her last menstrual period was 2018.  She is not currently using any form of contraception.  She performs breast self-exams occasionally.    Her last Pap smear was in 2017 and was normal.   Preventative Health updated today.  She is current with her Td.  She is not current with influenza immunization.  Ms. Orantes denies any history of abnormal Pap smears.  Tobacco: She has never smoked.      ROS:     CONSTITUTIONAL:  Negative for chills, fatigue, fever, and weight change.      EYES:  Negative for blurred vision.      CARDIOVASCULAR:  Negative for chest pain, orthopnea, paroxysmal nocturnal dyspnea and pedal edema.      RESPIRATORY:  Negative for dyspnea.      GASTROINTESTINAL:  Negative for abdominal pain, constipation, diarrhea, nausea and vomiting.      MUSCULOSKELETAL:  Negative for arthralgias, back pain, and myalgias.      NEUROLOGICAL:  Negative for dizziness, headaches, paresthesias, and weakness.      PSYCHIATRIC:  Negative for anxiety, depression, and sleep disturbances.          PMH/FMH/SH:     Last Reviewed on 2018 06:41 PM by Irish Dave    Past Medical History:                 PAST MEDICAL HISTORY         history of near syncopy , did have a cardiac work up         GYNECOLOGICAL HISTORY:    ; 1 molar pregnancy    Contraception: partner is S/P vasectomy;         PREVENTIVE HEALTH MAINTENANCE             PAP SMEAR: was last done 2017 with normal results         Surgical  History:         Dilation and Curettage: 2009;         Family History:     Father:     Mother: Hypertension;  Hypothyroidism     Brother(s): 1 brother(s) total;  episodes fo low blood sugar     Paternal Grandmother: Type 2 Diabetes ( feels like she may have been diet controlled )     Maternal Grandfather: Myocardial Infarction ( has had multople episodes,and bypass surrgery )     Maternal Grandmother: COPD         Social History:     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  Non-drinker     Caffeine:  She admits to consuming caffeine via soda ( 1 serving per day ).              Current Problems:     Last Reviewed on 2/19/2018 06:41 PM by Irish Dvae    Hypercholesterolemia     Hair thinning     Near-syncope     Tachycardia, NOS         Immunizations:     None        Allergies:     Last Reviewed on 2/19/2018 06:41 PM by Irish Dave    Zithromax Z-Quique:        Current Medications:     Last Reviewed on 2/19/2018 06:41 PM by Irish Dave    None        OBJECTIVE:        Vitals:         Current: 2/19/2018 5:59:54 PM    Ht:  5 ft, 3.5 in;  Wt: 155.3 lbs;  BMI: 27.1    T: 97.2 F (oral);  BP: 100/59 mm Hg (left arm, sitting);  P: 78 bpm (left arm (BP Cuff), sitting);  sCr: 0.69 mg/dL;  GFR: 116.42        Exams:     PHYSICAL EXAM:     GENERAL: vital signs recorded - well developed, well nourished;  no apparent distress;     EYES: extraocular movements intact; conjunctiva and cornea are normal; PERRLA;     E/N/T:  normal EACs, TMs, nasal/oral mucosa, teeth, gingiva, and oropharynx;     NECK: range of motion is normal; thyroid is non-palpable;     RESPIRATORY: normal respiratory rate and pattern with no distress; normal breath sounds with no rales, rhonchi, wheezes or rubs;     CARDIOVASCULAR: normal rate; rhythm is  regular;  no systolic murmur; no edema;     GASTROINTESTINAL: nontender; normal bowel sounds; no organomegaly;     MUSCULOSKELETAL:  Normal range of motion, strength and tone;     NEUROLOGICAL:  cranial nerves, motor and sensory function, reflexes, gait and coordination are all intact;     PSYCHIATRIC:  appropriate affect and demeanor; normal speech pattern; grossly normal memory;         ASSESSMENT:           V70.0   Z00.00  Annual exam              DDx:     V79.0   Z13.89  Screening for depression              DDx:         ORDERS:         Other Orders:       [id number]  Behav assmt w/score & docd/stand instrument  (In-House)           Depression screen negative  (In-House)                   PLAN:          Annual exam fax exam papers dmt          Screening for depression     MIPS PHQ-9 Depression Screening: Completed form scanned and in chart; Total Score 0           Orders:       [id number]  Behav assmt w/score & docd/stand instrument  (In-House)           Depression screen negative  (In-House)               CHARGE CAPTURE:           Primary Diagnosis:     V70.0 Annual exam            Z00.00    Encntr for general adult medical exam w/o abnormal findings              Orders:          04030   Preventive medicine, established patient, age 18-39 years  (In-House)           V79.0 Screening for depression            Z13.89    Encounter for screening for other disorder              Orders:          [id number]   Behav assmt w/score & docd/stand instrument  (In-House)                Depression screen negative  (In-House)

## 2021-05-18 NOTE — PROGRESS NOTES
Naina Orantes  1985     Office/Outpatient Visit    Visit Date:  08:59 am    Provider: Hemanth cMclain MD (Assistant: Shayy Guevara RN)    Location: [facility]        Electronically signed by Hemanth Mcclain MD on  2020 06:31:39 AM                             Subjective:        CC: physical exam    HPI:       Naina is in today for wellness exam.  She is 34 years old and her  works for [employer].  She does not smoke.  She uses no alcohol.  She is in overall good health.  She says that she does need to see her gyn soon, but she has not had issues up to this time.    ROS:     CONSTITUTIONAL:  Negative for chills and fever.      CARDIOVASCULAR:  Negative for chest pain and palpitations.      RESPIRATORY:  Negative for recent cough and dyspnea.      GASTROINTESTINAL:  Negative for abdominal pain, nausea and vomiting.      INTEGUMENTARY/BREAST:  Negative for atypical mole(s) and rash.      NEUROLOGICAL:  Negative for paresthesias and weakness.          Past Medical History / Family History / Social History:         Last Reviewed on 2020 09:08 AM by Hemanth Mcclain    Past Medical History:                 PAST MEDICAL HISTORY         history of near syncopy , did have a cardiac work up         GYNECOLOGICAL HISTORY:    ; 1 molar pregnancy    Contraception: partner is S/P vasectomy;         PREVENTIVE HEALTH MAINTENANCE             PAP SMEAR: was last done 2017 with normal results         Surgical History:         Dilation and Curettage: ;         Family History:     Father:     Mother: Hypertension;  Hypothyroidism     Brother(s): 1 brother(s) total;  episodes fo low blood sugar     Paternal Grandmother: Type 2 Diabetes ( feels like she may have been diet controlled )     Maternal Grandfather: Myocardial Infarction ( has had multople episodes,and bypass surrgery )     Maternal Grandmother: COPD         Social History:     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  Non-drinker     Caffeine:  She admits to consuming caffeine via soda ( 1 serving per day ).          Substance Abuse History:     Last Reviewed on 2/21/2020 09:08 AM by Hemanth Mcclain    NEGATIVE         Mental Health History:     Last Reviewed on 2/21/2020 09:08 AM by Hemanth Mcclain        Communicable Diseases (eg STDs):     Last Reviewed on 2/21/2020 09:08 AM by Hemanth Mcclain        Current Problems:     Last Reviewed on 2/21/2020 09:08 AM by Hemanth Mcclain    Encounter for general adult medical examination without abnormal findings        Immunizations:     None        Allergies:     Last Reviewed on 2/21/2020 09:08 AM by Hemanth Mcclain    Zithromax Z-Quique:          Current Medications:     Last Reviewed on 2/21/2020 09:08 AM by Hemanth Mcclain    None        Objective:        Vitals:         Current: 2/21/2020 9:01:56 AM    Ht:  5 ft, 3.5 in;  Wt: 160.8 lbs;  BMI: 28.0T: 98 F (oral);  BP: 117/71 mm Hg (right arm, sitting);  P: 89 bpm (right arm (BP Cuff), sitting);  sCr: 0.69 mg/dL;  GFR: 116.01        Exams:     PHYSICAL EXAM:     GENERAL: vital signs recorded - well developed, well nourished;  no apparent distress;     EYES: extraocular movements intact; conjunctiva and cornea are normal; PERRL;     E/N/T:  normal EACs, TMs, nasal/oral mucosa, teeth, gingiva, and oropharynx;     NECK: range of motion is normal; thyroid is non-palpable;     RESPIRATORY: normal respiratory rate and pattern with no distress; normal breath sounds with no rales, rhonchi, wheezes or rubs;     CARDIOVASCULAR: normal rate; rhythm is regular;  no systolic murmur; no edema;     GASTROINTESTINAL: nontender; normal bowel sounds; no organomegaly;     LYMPHATIC: no  enlargement of cervical or facial nodes; no supraclavicular nodes;     BREAST/INTEGUMENT: SKIN: no significant rashes or lesions; no suspicious moles;     NEUROLOGIC: mental status: alert and oriented x 3; cranial nerves II-XII grossly intact;     PSYCHIATRIC: appropriate affect and demeanor; normal psychomotor function;         Assessment:         Z00.00   Encounter for general adult medical examination without abnormal findings           ORDERS:         Lab Orders:       [id number]  [facility] PHYSICAL: CMP, CBC, TSH, LIPID: [id number], [id number], [id number], [id number]  (Send-Out)              Other Orders:         Depression screen negative  (In-House)                      Plan:         Encounter for general adult medical examination without abnormal findings    LABORATORY:  Labs ordered to be performed today include PHYSICAL PANEL; CMP, CBC, TSH, LIPID.      RECOMMENDATIONS given include: Naina is in good health.  We will update labs and then complete her paperwork for [facility].  No changes are anticipated..  [facility] PHQ-9 Depression Screening: Completed form scanned and in chart; Total Score 0; Negative Depression Screen           Orders:       [id number]  [facility] PHYSICAL: CMP, CBC, TSH, LIPID: [id number], [id number]  (Send-Out)              Depression screen negative  (In-House)                Patient Education Handouts:       Physical Exam 30-39 year, Female              Charge Capture:         Primary Diagnosis:     Z00.00  Encounter for general adult medical examination without abnormal findings           Orders:      [id number]  Preventive medicine, established patient, age 18-39 years  (In-House)              Depression screen negative  (In-House)

## 2021-07-01 VITALS
SYSTOLIC BLOOD PRESSURE: 112 MMHG | TEMPERATURE: 98.4 F | HEIGHT: 64 IN | WEIGHT: 163.6 LBS | BODY MASS INDEX: 27.93 KG/M2 | HEART RATE: 79 BPM | DIASTOLIC BLOOD PRESSURE: 71 MMHG

## 2021-07-01 VITALS
SYSTOLIC BLOOD PRESSURE: 100 MMHG | BODY MASS INDEX: 26.51 KG/M2 | WEIGHT: 155.3 LBS | HEART RATE: 78 BPM | HEIGHT: 64 IN | TEMPERATURE: 97.2 F | DIASTOLIC BLOOD PRESSURE: 59 MMHG

## 2021-07-02 VITALS
TEMPERATURE: 96.9 F | OXYGEN SATURATION: 100 % | DIASTOLIC BLOOD PRESSURE: 72 MMHG | BODY MASS INDEX: 28.58 KG/M2 | HEIGHT: 64 IN | HEART RATE: 131 BPM | WEIGHT: 167.4 LBS | SYSTOLIC BLOOD PRESSURE: 128 MMHG

## 2021-07-02 VITALS
HEART RATE: 89 BPM | BODY MASS INDEX: 27.45 KG/M2 | HEIGHT: 64 IN | WEIGHT: 160.8 LBS | DIASTOLIC BLOOD PRESSURE: 71 MMHG | TEMPERATURE: 98 F | SYSTOLIC BLOOD PRESSURE: 117 MMHG

## 2021-07-02 VITALS
HEART RATE: 91 BPM | HEIGHT: 64 IN | BODY MASS INDEX: 28.68 KG/M2 | TEMPERATURE: 97.6 F | DIASTOLIC BLOOD PRESSURE: 64 MMHG | SYSTOLIC BLOOD PRESSURE: 113 MMHG | WEIGHT: 168 LBS

## 2021-07-02 VITALS
DIASTOLIC BLOOD PRESSURE: 83 MMHG | TEMPERATURE: 96.2 F | BODY MASS INDEX: 28.09 KG/M2 | SYSTOLIC BLOOD PRESSURE: 114 MMHG | HEART RATE: 108 BPM | WEIGHT: 164.5 LBS | HEIGHT: 64 IN